# Patient Record
Sex: MALE | Race: WHITE | NOT HISPANIC OR LATINO | Employment: OTHER | URBAN - METROPOLITAN AREA
[De-identification: names, ages, dates, MRNs, and addresses within clinical notes are randomized per-mention and may not be internally consistent; named-entity substitution may affect disease eponyms.]

---

## 2017-01-04 NOTE — PATIENT DISCUSSION
Diabetes without Retinopathy Counseling:  I have discussed with the patient the importance of controlling blood glucose, blood pressure and lipid levels levels  to minimize the risk of developing retinal disease from diabetes. Explained the importance of annual dilated eye exams because effective treatment for diabetic retinopathy depends on timely intervention. The patient was instructed to call or return sooner if they noticed blurred or distorted vision, fluctuating vision, pain or redness around one or both eyes. Return for follow-up as scheduled.

## 2020-09-08 NOTE — PATIENT DISCUSSION
CORNEAL ABRASION (EPI DEFECT, INFERIOR TO PUPILLARY AXIS) SECONDARY TO FB REMOVAL AT ER. HEALING WELL.

## 2020-09-08 NOTE — PATIENT DISCUSSION
New Prescription: erythromycin (erythromycin): ointment: 5 mg/gram (0.5 %) a small amount at bedtime into affected eye 09-

## 2020-09-18 NOTE — PATIENT DISCUSSION
DRY EYE WITH INFLAMMATION:  PRESERVATIVE FREE ARTIFICIAL TEARS Q2 HOURS, LID HYGIENE. PRESCRIBED LOTEMAX TID OU . CONSIDER RESTASIS  OR PUNCTAL PLUGS AND/OR LIPIFLOW AT FOLLOW. RETURN SOONER IF SYMPTOMS WORSEN.

## 2020-09-18 NOTE — PATIENT DISCUSSION
Continue: erythromycin (erythromycin): ointment: 5 mg/gram (0.5 %) a small amount at bedtime into affected eye 09-

## 2020-12-08 ENCOUNTER — PREPPED CHART (OUTPATIENT)
Dept: URBAN - METROPOLITAN AREA CLINIC 19 | Facility: CLINIC | Age: 45
End: 2020-12-08

## 2021-06-09 NOTE — PATIENT DISCUSSION
Stopped Today: erythromycin (erythromycin): ointment: 5 mg/gram (0.5 %) a small amount at bedtime into affected eye 09-

## 2022-03-03 ENCOUNTER — ESTABLISHED PATIENT (OUTPATIENT)
Dept: URBAN - METROPOLITAN AREA CLINIC 19 | Facility: CLINIC | Age: 47
End: 2022-03-03

## 2022-03-03 DIAGNOSIS — H52.4: ICD-10-CM

## 2022-03-03 PROCEDURE — 92014 COMPRE OPH EXAM EST PT 1/>: CPT

## 2022-03-03 ASSESSMENT — TONOMETRY
OS_IOP_MMHG: 18
OD_IOP_MMHG: 19

## 2022-03-03 ASSESSMENT — KERATOMETRY
OS_K2POWER_DIOPTERS: 43.50
OD_AXISANGLE2_DEGREES: 62
OD_AXISANGLE_DEGREES: 152
OS_K1POWER_DIOPTERS: 43.25
OS_AXISANGLE_DEGREES: 6
OD_K2POWER_DIOPTERS: 43.75
OD_K1POWER_DIOPTERS: 42.75
OS_AXISANGLE2_DEGREES: 96

## 2022-03-03 ASSESSMENT — VISUAL ACUITY
OS_CC: 20/25-2
OS_SC: 20/25-2
OD_SC: 20/20
OU_CC: 20/20

## 2022-08-09 NOTE — PATIENT DISCUSSION
Risks, benefits, limitations, and alternatives of cataract extraction discussed with patient, including but not limited to: bleeding, infection, acute or chronic intraocular inflammation, retinal hole/tear/detachment, increased or decreased intraocular pressure, macular edema, corneal edema, posterior capsule opacification, ptosis, irregular pupil, no improvement in vision, worsened vision, need for additional surgery, and death. Patient understands the risks and wishes to take some time to consider moving forward with the procedure.

## 2023-06-22 ENCOUNTER — ESTABLISHED PATIENT (OUTPATIENT)
Dept: URBAN - METROPOLITAN AREA CLINIC 19 | Facility: CLINIC | Age: 48
End: 2023-06-22

## 2023-06-22 DIAGNOSIS — H52.4: ICD-10-CM

## 2023-06-22 PROCEDURE — 92015 DETERMINE REFRACTIVE STATE: CPT

## 2023-06-22 PROCEDURE — 92014 COMPRE OPH EXAM EST PT 1/>: CPT

## 2023-06-22 ASSESSMENT — KERATOMETRY
OD_AXISANGLE_DEGREES: 155
OS_AXISANGLE2_DEGREES: 115
OD_AXISANGLE2_DEGREES: 65
OS_K2POWER_DIOPTERS: 43.75
OD_K1POWER_DIOPTERS: 43.50
OS_K1POWER_DIOPTERS: 43.50
OS_AXISANGLE_DEGREES: 25
OD_K2POWER_DIOPTERS: 44.25

## 2023-06-22 ASSESSMENT — TONOMETRY
OS_IOP_MMHG: 14
OD_IOP_MMHG: 16

## 2023-06-22 ASSESSMENT — VISUAL ACUITY
OU_CC: 20/25
OD_SC: 20/20
OS_SC: 20/20

## 2023-06-24 NOTE — PATIENT DISCUSSION
COUNSELING: FREE:[LAST:[YOUR PMD],PHONE:[(   )    -],FAX:[(   )    -],FOLLOWUP:[1-3 Days]],PROVIDER:[TOKEN:[[id number]],FOLLOWUP:[1-3 Days]]

## 2023-11-06 NOTE — PATIENT DISCUSSION
Corneal Abrasion Counseling: I have explained to the patient that most minor corneal abrasions fully recover  without permanent eye damage. I have further explained that deeper scratches can cause corneal infections, erosion, chronic loose epithelium  or scarring if not treated properly. These complications can result in long-term vision problems. I have explained that proper healing is dependent on patient compliance of the treatment prescribed and to follow up as scheduled. It was explained that a bandage contact lens may increase the possibility of an infection and the alternative treatments including patching or lubrication alone were provided to the patient. The patient has elected to proceed with the bandage contact lens for comfort and convenience. The patient was instructed to keep water and any material including makeup if applicable out of the affected eye. The patient has been instructed to report any unusual symptoms during or following healing to include a recurrence of pain. N/A

## 2024-07-01 ENCOUNTER — ESTABLISHED PATIENT (OUTPATIENT)
Dept: URBAN - METROPOLITAN AREA CLINIC 19 | Facility: CLINIC | Age: 49
End: 2024-07-01

## 2024-07-01 DIAGNOSIS — H52.4: ICD-10-CM

## 2024-07-01 PROCEDURE — 92015 DETERMINE REFRACTIVE STATE: CPT

## 2024-07-01 PROCEDURE — 92014 COMPRE OPH EXAM EST PT 1/>: CPT

## 2024-07-01 ASSESSMENT — KERATOMETRY
OD_K1POWER_DIOPTERS: 43.5
OS_K1POWER_DIOPTERS: 43.75
OS_AXISANGLE_DEGREES: 142
OD_K2POWER_DIOPTERS: 44.25
OS_K2POWER_DIOPTERS: 43.50
OD_AXISANGLE2_DEGREES: 53
OD_AXISANGLE_DEGREES: 143
OS_AXISANGLE2_DEGREES: 52

## 2024-07-01 ASSESSMENT — VISUAL ACUITY
OD_CC: 20/20
OS_CC: 20/20
OU_CC: 20/20

## 2024-07-01 ASSESSMENT — TONOMETRY
OD_IOP_MMHG: 20
OS_IOP_MMHG: 21

## 2024-08-20 ENCOUNTER — CONTACT LENSES/GLASSES VISIT (OUTPATIENT)
Dept: URBAN - METROPOLITAN AREA CLINIC 19 | Facility: CLINIC | Age: 49
End: 2024-08-20

## 2024-08-20 DIAGNOSIS — H52.4: ICD-10-CM

## 2024-08-20 PROCEDURE — 99211NC NO CHARGE VISIT

## 2024-08-20 PROCEDURE — 92015 DETERMINE REFRACTIVE STATE: CPT | Mod: NC

## 2024-08-20 ASSESSMENT — TONOMETRY
OS_IOP_MMHG: 21
OD_IOP_MMHG: 21

## 2024-08-20 ASSESSMENT — KERATOMETRY
OD_K2POWER_DIOPTERS: 44.00
OS_AXISANGLE2_DEGREES: 46
OD_K1POWER_DIOPTERS: 43.50
OS_AXISANGLE_DEGREES: 136
OS_K2POWER_DIOPTERS: 43.50
OD_AXISANGLE2_DEGREES: 62
OS_K1POWER_DIOPTERS: 43.75
OD_AXISANGLE_DEGREES: 152

## 2024-08-20 ASSESSMENT — VISUAL ACUITY
OD_CC: 20/25
OU_CC: 20/20
OS_CC: 20/30

## 2025-05-15 ENCOUNTER — COMPREHENSIVE EXAM (OUTPATIENT)
Age: 50
End: 2025-05-15

## 2025-05-15 DIAGNOSIS — H52.4: ICD-10-CM

## 2025-05-15 PROCEDURE — 99212 OFFICE O/P EST SF 10 MIN: CPT
